# Patient Record
Sex: MALE | Race: WHITE | NOT HISPANIC OR LATINO | Employment: OTHER | URBAN - METROPOLITAN AREA
[De-identification: names, ages, dates, MRNs, and addresses within clinical notes are randomized per-mention and may not be internally consistent; named-entity substitution may affect disease eponyms.]

---

## 2019-05-02 ENCOUNTER — THERAPY VISIT (OUTPATIENT)
Dept: PHYSICAL THERAPY | Facility: CLINIC | Age: 74
End: 2019-05-02
Payer: MEDICARE

## 2019-05-02 DIAGNOSIS — M53.3 SACROILIAC JOINT PAIN: ICD-10-CM

## 2019-05-02 PROCEDURE — 97110 THERAPEUTIC EXERCISES: CPT | Mod: GP | Performed by: PHYSICAL THERAPIST

## 2019-05-02 PROCEDURE — 97035 APP MDLTY 1+ULTRASOUND EA 15: CPT | Mod: GP | Performed by: PHYSICAL THERAPIST

## 2019-05-02 PROCEDURE — 97140 MANUAL THERAPY 1/> REGIONS: CPT | Mod: GP | Performed by: PHYSICAL THERAPIST

## 2019-05-02 PROCEDURE — 97162 PT EVAL MOD COMPLEX 30 MIN: CPT | Mod: GP | Performed by: PHYSICAL THERAPIST

## 2019-05-02 NOTE — LETTER
DEPARTMENT OF HEALTH AND HUMAN SERVICES  CENTERS FOR MEDICARE & MEDICAID SERVICES    PLAN/UPDATED PLAN OF PROGRESS FOR OUTPATIENT REHABILITATION    PATIENTS NAME:  Sunny Mercer     : 1945    [facility]BER:    [id number]    [id number]     PROVIDER NAME: [facility]    MEDICAL RECORD NUMBER: [id number]     START OF CARE DATE:  SOC Date: 19   TYPE:  PT    PRIMARY/TREATMENT DIAGNOSIS: (Pertinent Medical Diagnosis)  Sacroiliac joint pain    VISITS FROM START OF CARE:  Rxs Used: 1     [facility] Initial Evaluation  Subjective:  [facility] Initial Evaluation.    Mr. Mercer is an active 74 year old retired gentleman, who strained his low back in 2019 - while shoveling heavy wet snow. He labels his pain as 8/10. Located along the left SI - and referred into the left leg to the ankle. Myotomes are intact. AROM of the lumbar spine does not cause symptoms. The left SI appears somewhat anteriorly rotated. Sunny is very motivated to start a HEP>    The history is provided by the patient. No  was used.   Sunny Mercer is a 74 year old male with a sacroiliac condition.  Condition occurred with:  Lifting, repetition/overuse and a fall/slip.  Condition occurred: at home.  This is a new condition  2019.  Patient reports pain:  SI joint left.  Radiates to:  Gluteals left and lower leg left.  Pain is described as aching, sharp and shooting and is intermittent and reported as 8/10.  Associated symptoms:  Loss of motion/stiffness. Pain is worse during the day.  Symptoms are exacerbated by lifting and certain positions and relieved by nothing.  Since onset symptoms are unchanged.    General health as reported by patient is good.   Pertinent medical history includes:  Diabetes and high blood pressure.  Medical allergies: no.  Other surgeries include:  Orthopedic surgery (knee).  Current medications:  Other.  Current occupation is  Retired. Oswestry Score: 17.5 %.  Barriers include:  None as reported by the patient.  Red flags:  Unexplained weight loss and pain at night/rest.    Objective:  Standing Alignment:    Lumbar:  Lordosis decr  Gait:    Gait Type:  Antalgic     Deviations:  General Deviations:  Jade decr and stride length decr    PATIENTS NAME:  Sunny Mercer   : 1945- Page 2       Lumbar/SI Evaluation  ROM:    AROM Lumbar:   Flexion:          50%  Ext:                    10%   Side Bend:        Left:     Right:   Rotation:           Left:  50%    Right:  50%  Side Glide:        Left:     Right:   Lumbar Myotomes:  normal  Lumbar DTR's:  not assessed  Cord Signs:  not assessed  Lumbar Dermtomes:  not assessed  Neural Tension/Mobility:    Left side:SLR; SLR w/DF or Slump  negative.   Right side:   SLR w/DF; Slump or SLR  negative.   Lumbar Palpation:    Tenderness present at Left:    Piriformis and PSIS  SI joint/Sacrum:      Sacral conclusion left:  Downslip and anterior inominate                   Musculoskeletal:        Lumbar back: He exhibits tenderness.        Back:      Assessment/Plan:    Patient is a 74 year old male with left SI complaints.    Patient has the following significant findings with corresponding treatment plan.                Diagnosis 1:  Left SI strainPain -  US, manual therapy, self management, education and home program  Impaired gait - gait training and home program  Impaired muscle performance - neuro re-education and home program  Decreased function - therapeutic activities and home program    Therapy Evaluation Codes:   1) History comprised of:   Personal factors that impact the plan of care:    PATIENTS NAME:  Sunny Mercer   : 1945- Page 3      None.    Comorbidity factors that impact the plan of care are:      Diabetes, High blood pressure, Pain at night/rest and unexplained weight loss.     Medications impacting care: diabetes medication.  2) Examination of Body Systems comprised  "of:   Body structures and functions that impact the plan of care:      Sacral illiac joint.   Activity limitations that impact the plan of care are:      Bending, Walking and Sleeping.  3) Clinical presentation characteristics are:   Evolving/Changing.  4) Decision-Making    Moderate complexity using standardized patient assessment instrument and/or measureable assessment of functional outcome.  Cumulative Therapy Evaluation is: Moderate complexity.    Previous and current functional limitations:  (See Goal Flow Sheet for this information)    Short term and Long term goals: (See Goal Flow Sheet for this information)     Communication ability:  Patient appears to be able to clearly communicate and understand verbal and written communication and follow directions correctly.  Treatment Explanation - The following has been discussed with the patient:   RX ordered/plan of care  Anticipated outcomes  Possible risks and side effects  This patient would benefit from PT intervention to resume normal activities.   Rehab potential is good.    Frequency:  1 X week, once daily  Duration:  for 6 weeks  Discharge Plan:  Achieve all LTG.  Independent in home treatment program.  Reach maximal therapeutic benefit.    Caregiver Signature/Credentials _____________________________ Date ________       Treating Provider: Gabriela Greenfield DPT   I have reviewed and certified the need for these services and plan of treatment while under my care.    PHYSICIAN'S SIGNATURE:   _________________________________________  Date___________   Armaan Burgess MD    Certification period:  Beginning of Cert date period: 05/02/19 to  End of Cert period date: 07/21/19     Functional Level Progress Report: Please see attached \"Goal Flow sheet for Functional level.\"    ____X____ Continue Services or       ________ DC Services              Service dates: From  SOC Date: 05/02/19 date to present                         "

## 2019-05-03 NOTE — PROGRESS NOTES
[facility] Initial Evaluation  Subjective:  [facility] Initial Evaluation.    Mr. Mercer is an active 74 year old retired gentleman, who strained his low back in April 2019 - while shoveling heavy wet snow. He labels his pain as 8/10. Located along the left SI - and referred into the left leg to the ankle. Myotomes are intact. AROM of the lumbar spine does not cause symptoms. The left SI appears somewhat anteriorly rotated. Sunny is very motivated to start a HEP>    The history is provided by the patient. No  was used.   Sunny Mercer is a 74 year old male with a sacroiliac condition.  Condition occurred with:  Lifting, repetition/overuse and a fall/slip.  Condition occurred: at home.  This is a new condition  April 2019.    Patient reports pain:  SI joint left.  Radiates to:  Gluteals left and lower leg left.  Pain is described as aching, sharp and shooting and is intermittent and reported as 8/10.  Associated symptoms:  Loss of motion/stiffness. Pain is worse during the day.  Symptoms are exacerbated by lifting and certain positions and relieved by nothing.  Since onset symptoms are unchanged.        General health as reported by patient is good.                  Barriers include:  None as reported by the patient.    Red flags:  Unexplained weight loss and pain at night/rest.                        Objective:  Standing Alignment:        Lumbar:  Lordosis decr            Gait:    Gait Type:  Antalgic     Deviations:  General Deviations:  Jade decr and stride length decr               Lumbar/SI Evaluation  ROM:    AROM Lumbar:   Flexion:          50%  Ext:                    10%   Side Bend:        Left:     Right:   Rotation:           Left:  50%    Right:  50%  Side Glide:        Left:     Right:           Lumbar Myotomes:  normal            Lumbar DTR's:  not assessed      Cord Signs:  not assessed    Lumbar Dermtomes:  not  assessed                Neural Tension/Mobility:      Left side:SLR; SLR w/DF or Slump  negative.     Right side:   SLR w/DF; Slump or SLR  negative.   Lumbar Palpation:    Tenderness present at Left:    Piriformis and PSIS          SI joint/Sacrum:            Sacral conclusion left:  Downslip and anterior inominate                                                                                    Musculoskeletal:        Lumbar back: He exhibits tenderness.        Back:        ROS    Assessment/Plan:    Patient is a 74 year old male with left SI complaints.    Patient has the following significant findings with corresponding treatment plan.                Diagnosis 1:  Left SI strainPain -  US, manual therapy, self management, education and home program  Impaired gait - gait training and home program  Impaired muscle performance - neuro re-education and home program  Decreased function - therapeutic activities and home program    Therapy Evaluation Codes:   1) History comprised of:   Personal factors that impact the plan of care:      None.    Comorbidity factors that impact the plan of care are:      Diabetes, High blood pressure, Pain at night/rest and unexplained weight loss.     Medications impacting care: diabetes medication.  2) Examination of Body Systems comprised of:   Body structures and functions that impact the plan of care:      Sacral illiac joint.   Activity limitations that impact the plan of care are:      Bending, Walking and Sleeping.  3) Clinical presentation characteristics are:   Evolving/Changing.  4) Decision-Making    Moderate complexity using standardized patient assessment instrument and/or measureable assessment of functional outcome.  Cumulative Therapy Evaluation is: Moderate complexity.    Previous and current functional limitations:  (See Goal Flow Sheet for this information)    Short term and Long term goals: (See Goal Flow Sheet for this information)     Communication ability:   Patient appears to be able to clearly communicate and understand verbal and written communication and follow directions correctly.  Treatment Explanation - The following has been discussed with the patient:   RX ordered/plan of care  Anticipated outcomes  Possible risks and side effects  This patient would benefit from PT intervention to resume normal activities.   Rehab potential is good.    Frequency:  1 X week, once daily  Duration:  for 6 weeks  Discharge Plan:  Achieve all LTG.  Independent in home treatment program.  Reach maximal therapeutic benefit.    Please refer to the daily flowsheet for treatment today, total treatment time and time spent performing 1:1 timed codes.

## 2019-05-03 NOTE — PROGRESS NOTES
[facility] Initial Evaluation  Subjective:                                       Pertinent medical history includes:  Diabetes and high blood pressure.  Medical allergies: no.  Other surgeries include:  Orthopedic surgery (knee).  Current medications:  Other.  Current occupation is Retired.                  Oswestry Score: 17.5 %                 Objective:  System    Physical Exam    General     ROS    Assessment/Plan:

## 2019-05-09 ENCOUNTER — THERAPY VISIT (OUTPATIENT)
Dept: PHYSICAL THERAPY | Facility: CLINIC | Age: 74
End: 2019-05-09
Payer: MEDICARE

## 2019-05-09 DIAGNOSIS — M53.3 SACROILIAC JOINT PAIN: ICD-10-CM

## 2019-05-09 PROCEDURE — 97035 APP MDLTY 1+ULTRASOUND EA 15: CPT | Mod: GP | Performed by: PHYSICAL THERAPIST

## 2019-05-09 PROCEDURE — 97110 THERAPEUTIC EXERCISES: CPT | Mod: GP | Performed by: PHYSICAL THERAPIST

## 2019-05-09 PROCEDURE — 97140 MANUAL THERAPY 1/> REGIONS: CPT | Mod: GP | Performed by: PHYSICAL THERAPIST

## 2019-05-16 ENCOUNTER — THERAPY VISIT (OUTPATIENT)
Dept: PHYSICAL THERAPY | Facility: CLINIC | Age: 74
End: 2019-05-16
Payer: MEDICARE

## 2019-05-16 DIAGNOSIS — M53.3 SACROILIAC JOINT PAIN: ICD-10-CM

## 2019-05-16 PROCEDURE — 97110 THERAPEUTIC EXERCISES: CPT | Mod: GP | Performed by: PHYSICAL THERAPIST

## 2019-05-16 PROCEDURE — 97035 APP MDLTY 1+ULTRASOUND EA 15: CPT | Mod: GP | Performed by: PHYSICAL THERAPIST

## 2019-05-23 ENCOUNTER — THERAPY VISIT (OUTPATIENT)
Dept: PHYSICAL THERAPY | Facility: CLINIC | Age: 74
End: 2019-05-23
Payer: MEDICARE

## 2019-05-23 DIAGNOSIS — M53.3 SACROILIAC JOINT PAIN: ICD-10-CM

## 2019-05-23 PROCEDURE — 97140 MANUAL THERAPY 1/> REGIONS: CPT | Mod: GP | Performed by: PHYSICAL THERAPIST

## 2019-05-23 PROCEDURE — 97035 APP MDLTY 1+ULTRASOUND EA 15: CPT | Mod: GP | Performed by: PHYSICAL THERAPIST

## 2019-05-23 NOTE — PROGRESS NOTES
Subjective:  HPI                    Objective:  System    Physical Exam    General     ROS    Assessment/Plan:    PROGRESS  REPORT    Progress reporting period is from 5/2 to 5/23/19.       SUBJECTIVE  Subjective changes noted by patient:  Mr. Mercer reports no improvement with PT. The exercise given him is not painful while he is doing them, but he is painful in the AM. He has changed a daily aspirin intake of 1300 MG ( which he has done for 10 years ) to Tylenol since his injury. Question if this is a variable with his symptoms.       Current pain level is 7/10 in AM, improves by the evening.     Previous pain level was  7/10 Initial Pain level: 8/10.   Changes in function:  None  Adverse reaction to treatment or activity: Any exercise actually makes the symptoms worse - be it stretches or core strengthening. Sunny does not have pain during the exercise, but does the next morning.    OBJECTIVE  Changes noted in objective findings:  Posterior innominate on the left that is not responding to a HEP. Left SI symptoms may be referred from the lumbosacral spine. Sunny does report cramping in the left leg with some pain along the top of his foot. This could follow the L4-5 dermatome.        ASSESSMENT/PLAN  Updated problem list and treatment plan: Diagnosis 1:  LBP  Pain -  US, manual therapy, education and home program  Impaired gait - home program  Impaired muscle performance - neuro re-education and home program  Decreased function - home program  STG/LTGs have been met or progress has been made towards goals:  None  Assessment of Progress: The patient's condition is unchanged.  Self Management Plans:  Patient has been instructed in a home treatment program.  Patient  has been instructed in self management of symptoms.      Recommendations:  Further diagnostics may be in order to determine the continued symptoms that Sunny is having. Sunny is going on a trip June 15th to [place].    Please refer to the daily flowsheet for  treatment today, total treatment time and time spent performing 1:1 timed codes.

## 2019-05-23 NOTE — LETTER
[facility]  [address] [place] [id number]  [phone]    May 23, 2019    Re: Sunny Mercer   :   1945  MRN:  [id number]   REFERRING PHYSICIAN:   Armaan Burgess    [facility]    Date of Initial Evaluation:  2019  Visits:  Rxs Used: 4  Reason for Referral:  Sacroiliac joint pain    EVALUATION SUMMARY    PROGRESS  REPORT  Progress reporting period is from  to 19.       SUBJECTIVE  Subjective changes noted by patient:  Mr. Mercer reports no improvement with PT. The exercise given him is not painful while he is doing them, but he is painful in the AM. He has changed a daily aspirin intake of 1300 MG ( which he has done for 10 years ) to Tylenol since his injury. Question if this is a variable with his symptoms.       Current pain level is 7/10 in AM, improves by the evening.     Previous pain level was  7/10 Initial Pain level: 8/10.   Changes in function:  None  Adverse reaction to treatment or activity: Any exercise actually makes the symptoms worse - be it stretches or core strengthening. Sunny does not have pain during the exercise, but does the next morning.  OBJECTIVE  Changes noted in objective findings:  Posterior innominate on the left that is not responding to a HEP. Left SI symptoms may be referred from the lumbosacral spine. Sunny does report cramping in the left leg with some pain along the top of his foot. This could follow the L4-5 dermatome.  ASSESSMENT/PLAN  Updated problem list and treatment plan: Diagnosis 1:  LBP  Pain -  US, manual therapy, education and home program  Impaired gait - home program  Impaired muscle performance - neuro re-education and home program  Decreased function - home program  STG/LTGs have been met or progress has been made towards goals:  None  Assessment of Progress: The patient's condition is unchanged.  Self Management Plans:  Patient has been instructed in a home treatment program.  Patient   has been instructed in self management of symptoms.      Recommendations:  Further diagnostics may be in order to determine the continued symptoms that Sunny is having. Sunny is going on a trip June 15th to [place].              Thank you for your referral.    INQUIRIES  Therapist: Gabriela Greenfield DPT  [address] [address]  Phone: [phone]  Fax: [phone]

## 2019-06-27 PROBLEM — M53.3 SACROILIAC JOINT PAIN: Status: RESOLVED | Noted: 2019-05-02 | Resolved: 2019-05-23

## 2019-09-28 ENCOUNTER — HEALTH MAINTENANCE LETTER (OUTPATIENT)
Age: 74
End: 2019-09-28

## 2020-03-15 ENCOUNTER — HEALTH MAINTENANCE LETTER (OUTPATIENT)
Age: 75
End: 2020-03-15

## 2021-01-09 ENCOUNTER — HEALTH MAINTENANCE LETTER (OUTPATIENT)
Age: 76
End: 2021-01-09

## 2021-05-08 ENCOUNTER — HEALTH MAINTENANCE LETTER (OUTPATIENT)
Age: 76
End: 2021-05-08

## 2021-08-28 ENCOUNTER — HEALTH MAINTENANCE LETTER (OUTPATIENT)
Age: 76
End: 2021-08-28

## 2021-10-23 ENCOUNTER — HEALTH MAINTENANCE LETTER (OUTPATIENT)
Age: 76
End: 2021-10-23

## 2022-04-09 ENCOUNTER — HEALTH MAINTENANCE LETTER (OUTPATIENT)
Age: 77
End: 2022-04-09

## 2022-04-13 ENCOUNTER — APPOINTMENT (OUTPATIENT)
Dept: URBAN - METROPOLITAN AREA CLINIC 259 | Age: 77
Setting detail: DERMATOLOGY
End: 2022-04-13

## 2022-04-13 DIAGNOSIS — L71.8 OTHER ROSACEA: ICD-10-CM

## 2022-04-13 DIAGNOSIS — L21.8 OTHER SEBORRHEIC DERMATITIS: ICD-10-CM

## 2022-04-13 DIAGNOSIS — L40.0 PSORIASIS VULGARIS: ICD-10-CM

## 2022-04-13 PROCEDURE — 99204 OFFICE O/P NEW MOD 45 MIN: CPT

## 2022-04-13 PROCEDURE — OTHER MIPS QUALITY: OTHER

## 2022-04-13 PROCEDURE — OTHER PRESCRIPTION: OTHER

## 2022-04-13 PROCEDURE — OTHER COUNSELING: OTHER

## 2022-04-13 RX ORDER — METRONIDAZOLE 7.5 MG/G
CREAM TOPICAL
Qty: 45 | Refills: 5 | Status: ERX | COMMUNITY
Start: 2022-04-13

## 2022-04-13 RX ORDER — KETOCONAZOLE 20 MG/G
CREAM TOPICAL BID
Qty: 60 | Refills: 4 | Status: ERX | COMMUNITY
Start: 2022-04-13

## 2022-04-13 RX ORDER — HYDROCORTISONE 25 MG/G
CREAM TOPICAL
Qty: 30 | Refills: 5 | Status: ERX | COMMUNITY
Start: 2022-04-13

## 2022-04-13 ASSESSMENT — LOCATION SIMPLE DESCRIPTION DERM
LOCATION SIMPLE: LEFT BUTTOCK
LOCATION SIMPLE: LEFT FOREARM
LOCATION SIMPLE: RIGHT CHEEK

## 2022-04-13 ASSESSMENT — LOCATION DETAILED DESCRIPTION DERM
LOCATION DETAILED: LEFT PROXIMAL DORSAL FOREARM
LOCATION DETAILED: LEFT BUTTOCK
LOCATION DETAILED: LEFT VENTRAL PROXIMAL FOREARM
LOCATION DETAILED: RIGHT CENTRAL MALAR CHEEK

## 2022-04-13 ASSESSMENT — LOCATION ZONE DERM
LOCATION ZONE: TRUNK
LOCATION ZONE: ARM
LOCATION ZONE: FACE

## 2022-04-13 NOTE — HPI: RASH (PSORIASIS)
Do You Have A Family History Of Psoriasis?: no
How Severe Is Your Psoriasis?: mild
Is This A New Presentation, Or A Follow-Up?: Psoriasis
Additional History: Patient is new to [place] and had a change in insurance. He has some redness on his face and cheeks that he has used betamethasone. He has psoriasis and it originally was on his buttocks. He has a flare up on his left arm. The patient has had the flare up on his left arm for 3 months.

## 2022-06-04 ENCOUNTER — HEALTH MAINTENANCE LETTER (OUTPATIENT)
Age: 77
End: 2022-06-04

## 2022-06-07 ENCOUNTER — THERAPY VISIT (OUTPATIENT)
Dept: PHYSICAL THERAPY | Facility: CLINIC | Age: 77
End: 2022-06-07
Payer: MEDICARE

## 2022-06-07 ENCOUNTER — TRANSCRIBE ORDERS (OUTPATIENT)
Dept: OTHER | Age: 77
End: 2022-06-07

## 2022-06-07 DIAGNOSIS — M54.50 LOW BACK PAIN: Primary | ICD-10-CM

## 2022-06-07 DIAGNOSIS — M54.16 LEFT LUMBAR RADICULOPATHY: ICD-10-CM

## 2022-06-07 PROCEDURE — 97161 PT EVAL LOW COMPLEX 20 MIN: CPT | Mod: GP | Performed by: PHYSICAL THERAPIST

## 2022-06-07 PROCEDURE — 97110 THERAPEUTIC EXERCISES: CPT | Mod: GP | Performed by: PHYSICAL THERAPIST

## 2022-06-07 NOTE — PROGRESS NOTES
Physical Therapy Initial Evaluation  Subjective:  Patient presents to outpatient PT with 3 month h/o back pain s/p fall.   Patient felt sudden pain after slipping on ice.   Patient had MRI, revealed L4-5 nerve compression.  Symptoms into left leg, to foot.  Standing aggravates symptoms.  Walking can aggravate symptoms, not as bad as standing.  Transition in bed can aggravate symptoms.  Bending has become more painful.  Patient does walk for exercise.      The history is provided by the patient. No  was used.   Patient Health History  Sunny Toering being seen for to try to minimize pain.     Problem began: 3/8/2022.   Problem occurred: slipping on ice and jerking back   Pain is reported as 6/10 on pain scale.  General health as reported by patient is good.  Pertinent medical history includes: diabetes, heart problems, high blood pressure, sleep disorder/apnea and thyroid problems.   Red flags:  None as reported by patient.  Medical allergies: none.   Surgeries include:  Orthopedic surgery. Other surgery history details: meniscus repair, prostate/bladder surgery.    Current medications:  Cardiac medication, high blood pressure medication and thyroid medication.    Current occupation is retired.   Primary job tasks include:  Driving, lifting/carrying, prolonged sitting and other.   Other job/home tasks details: yard work, limited home repair, snow removal.                Therapist Generated HPI Evaluation         Type of problem:  Lumbar.    This is a new condition.  Condition occurred with:  A fall/slip.  Where condition occurred: at home.  Patient reports pain:  Lumbar spine left.  and is intermittent.  Pain radiates to:  Gluteals left, thigh left, lower leg left and foot left.   Since onset symptoms are unchanged.  Associated symptoms:  Loss of motion/stiffness. Symptoms are exacerbated by bending, walking and standing  and relieved by rest.  Special tests included:  MRI.    Barriers include:   None as reported by patient.                        Objective:  System         Lumbar/SI Evaluation    Lumbar Myotomes:    T12-L3 (Hip Flex):  Left: 5-    Right: 5  L2-4 (Quads):  Left:  5    Right:  5  L4 (Ankle DF):  Left:  5    Right:  5            Neural Tension/Mobility:    Left side:  SLR positive.  Left side:Slump  negative.     Right side:   Slump or SLR  negative.   Lumbar Palpation:  normal                                                           Jj Lumbar Evaluation    Posture:  Sitting: fair  Standing: fair  Lordosis: Reduced  Lateral Shift: no  Correction of Posture: no effect    Movement Loss:  Flexion (Flex): min  Extension (EXT): mod and pain  Side [place] R (SG R): min  Side Glide L (SG L): mod and pain  Test Movements:            SGIS L:   Repeat SGIS L: During: increases  After: no worse  Mechanical Response: no effect  Static Tests:          Lying Prone in Extension: prone on elbows x 2 minutes (2 trials)- produces left low back, no worse    Conclusion: derangement  Principle of Treatment:          Other: trial left lumbar flexion rotation                                       ROS    Assessment/Plan:    Patient is a 77 year old male with lumbar complaints.    Patient has the following significant findings with corresponding treatment plan.                Diagnosis 1:  Left lumbar and leg  Pain -  manual therapy, self management, education, directional preference exercise and home program  Decreased ROM/flexibility - manual therapy, therapeutic exercise and home program  Decreased joint mobility - manual therapy, therapeutic exercise and home program  Decreased strength - therapeutic exercise, therapeutic activities and home program  Impaired posture - neuro re-education and home program    Therapy Evaluation Codes:   1) History comprised of:   Personal factors that impact the plan of care:      None.    Comorbidity factors that impact the plan of care are:      Diabetes, Heart problems, High  blood pressure and Sleep disorder/apnea.     Medications impacting care: Cardiac, High blood pressure and thyroid.  2) Examination of Body Systems comprised of:   Body structures and functions that impact the plan of care:      Lumbar spine.   Activity limitations that impact the plan of care are:      Bending, Standing and Walking.  3) Clinical presentation characteristics are:   Stable/Uncomplicated.  4) Decision-Making    Low complexity using standardized patient assessment instrument and/or measureable assessment of functional outcome.  Cumulative Therapy Evaluation is: Low complexity.    Previous and current functional limitations:  (See Goal Flow Sheet for this information)    Short term and Long term goals: (See Goal Flow Sheet for this information)     Communication ability:  Patient appears to be able to clearly communicate and understand verbal and written communication and follow directions correctly.  Treatment Explanation - The following has been discussed with the patient:   RX ordered/plan of care  Anticipated outcomes  Possible risks and side effects  This patient would benefit from PT intervention to resume normal activities.   Rehab potential is good.    Frequency:  1 X week, once daily  Duration:  for 8 weeks  Discharge Plan:  Achieve all LTG.  Independent in home treatment program.  Reach maximal therapeutic benefit.    Please refer to the daily flowsheet for treatment today, total treatment time and time spent performing 1:1 timed codes.

## 2022-06-07 NOTE — PROGRESS NOTES
[facility]    OUTPATIENT Physical Therapy ORTHOPEDIC EVALUATION  PLAN OF TREATMENT FOR OUTPATIENT REHABILITATION  (COMPLETE FOR INITIAL CLAIMS ONLY)  Patient's Last Name, First Name, M.I.  YOB: 1945  Sunny Mercer       Provider s Name:  [facility]   Medical Record No.  [id number]   Start of Care Date:  06/07/22   Onset Date:   03/08/22   Type:     _X__PT   ___OT Medical Diagnosis:    Encounter Diagnosis   Name Primary?    Left lumbar radiculopathy         Treatment Diagnosis:  left lumbar radiculopathy        Goals:     06/07/22 0500   Body Part   Goals listed below are for Low back pain   Goal #1   Goal #1 standing   Previous Functional Level No restrictions   Current Functional Level Minutes patient can stand   Performance level less than 5   STG Target Performance Minutes patient will be able to stand   Performance level 10   Rationale for personal hygiene   Due date 07/05/22   LTG Target Performance Minutes patient will be able to stand   Performance Level 20   Rationale for personal hygiene   Due date 08/02/22       Therapy Frequency:  1x/week  Predicted Duration of Therapy Intervention:  8 weeks    Barbara Warren, SAROJ                 I CERTIFY THE NEED FOR THESE SERVICES FURNISHED UNDER        THIS PLAN OF TREATMENT AND WHILE UNDER MY CARE .             Physician Signature               Date    X_____________________________________________________                         Certification Date From:  06/07/22   Certification Date To:  09/04/22    Referring Provider:  Sebastian Chavis    Initial Assessment        See Epic Evaluation SOC Date: 06/07/22

## 2022-06-14 ENCOUNTER — THERAPY VISIT (OUTPATIENT)
Dept: PHYSICAL THERAPY | Facility: CLINIC | Age: 77
End: 2022-06-14
Payer: MEDICARE

## 2022-06-14 DIAGNOSIS — M54.16 LEFT LUMBAR RADICULOPATHY: Primary | ICD-10-CM

## 2022-06-14 PROCEDURE — 97110 THERAPEUTIC EXERCISES: CPT | Mod: GP | Performed by: PHYSICAL THERAPIST

## 2022-06-28 ENCOUNTER — THERAPY VISIT (OUTPATIENT)
Dept: PHYSICAL THERAPY | Facility: CLINIC | Age: 77
End: 2022-06-28
Payer: MEDICARE

## 2022-06-28 DIAGNOSIS — M54.16 LEFT LUMBAR RADICULOPATHY: Primary | ICD-10-CM

## 2022-06-28 PROCEDURE — 97140 MANUAL THERAPY 1/> REGIONS: CPT | Mod: GP | Performed by: PHYSICAL THERAPIST

## 2022-06-28 PROCEDURE — 97110 THERAPEUTIC EXERCISES: CPT | Mod: GP | Performed by: PHYSICAL THERAPIST

## 2022-07-26 ENCOUNTER — THERAPY VISIT (OUTPATIENT)
Dept: PHYSICAL THERAPY | Facility: CLINIC | Age: 77
End: 2022-07-26
Payer: MEDICARE

## 2022-07-26 DIAGNOSIS — M54.16 LEFT LUMBAR RADICULOPATHY: Primary | ICD-10-CM

## 2022-07-26 PROCEDURE — 97110 THERAPEUTIC EXERCISES: CPT | Mod: GP | Performed by: PHYSICAL THERAPIST

## 2022-07-26 NOTE — LETTER
HSAN [facility]  [address]  [address]  [facility] [id number] [phone]    2022    Re: Sunny Mercer   :   1945  MRN:  [id number]   REFERRING PHYSICIAN:   Sebastian TORREZ [facility]    Date of Initial Evaluation:  2022  Visits:  Rxs Used: 4  Reason for Referral:  Left lumbar radiculopathy     Lumbar/SI Evaluation  Lumbar Myotomes:    T12-L3 (Hip Flex):  Left: 5    Right: 5  L2-4 (Quads):  Left:  5    Right:  5  L4 (Ankle DF):  Left:  5    Right:  5    Jj Lumbar Evaluation  Movement Loss:  Flexion (Flex): min  Extension (EXT): mod  Side [place] R (SG R): min  Side [place] L (SG L): min    PROGRESS  REPORT    Progress reporting period is from 22 to 22.       SUBJECTIVE  Subjective changes noted by patient:  Patient reports symptoms have improved.  Continues to experience back and leg symptoms with walking and standing, but symptoms decreased in intensity (able to walk as long as he wants).       Current pain level is 2/10  .     Previous pain level was  6/10 Initial Pain level: 6/10.   Changes in function:  Yes (See Goal flowsheet attached for changes in current functional level)  Adverse reaction to treatment or activity: None    OBJECTIVE  Changes noted in objective findings:  Yes, increased lumbar AROM with lumbar extension stretching.      ASSESSMENT/PLAN  Updated problem list and treatment plan: Diagnosis 1:  Left lumbar radiculopathy  Pain - self management, education, directional preference exercise and home program  Decreased ROM/flexibility - home program  Re: Sunny Mercer   :   1945        Decreased strength - home program  STG/LTGs have been met or progress has been made towards goals:  Yes (See Goal flow sheet completed today.)  Assessment of Progress: The patient's condition is improving.  Self Management Plans:  Patient is independent in self  management of symptoms.  I have re-evaluated this patient and find that the nature, scope, duration and intensity of the therapy is appropriate for the medical condition of the patient.  Sunny continues to require the following intervention to meet STG and LTG's:  Trial self-management over next 2 months, will follow up in PT at that time to re-assess progress and update plan of care as indicated.    Recommendations:  This patient would benefit from continued therapy.     Frequency:  Follow up in 2 months  Duration:  for 2 months    Thank you for your referral.      INQUIRIES  Therapist: Barbara Warren DPT   [address] [address]  Phone: [phone]  Fax: [phone]

## 2022-07-26 NOTE — PROGRESS NOTES
Subjective:  HPI  Physical Exam                    Objective:  System         Lumbar/SI Evaluation    Lumbar Myotomes:    T12-L3 (Hip Flex):  Left: 5    Right: 5  L2-4 (Quads):  Left:  5    Right:  5  L4 (Ankle DF):  Left:  5    Right:  5                                                                     Jj Lumbar Evaluation      Movement Loss:  Flexion (Flex): min  Extension (EXT): mod  Side [place] R (SG R): min  Side [employer] L (SG L): min                                               ROS    Assessment/Plan:    PROGRESS  REPORT    Progress reporting period is from 6/7/22 to 7/26/22.       SUBJECTIVE  Subjective changes noted by patient:  Patient reports symptoms have improved.  Continues to experience back and leg symptoms with walking and standing, but symptoms decreased in intensity (able to walk as long as he wants).       Current pain level is 2/10  .     Previous pain level was  6/10 Initial Pain level: 6/10.   Changes in function:  Yes (See Goal flowsheet attached for changes in current functional level)  Adverse reaction to treatment or activity: None    OBJECTIVE  Changes noted in objective findings:  Yes, increased lumbar AROM with lumbar extension stretching.        ASSESSMENT/PLAN  Updated problem list and treatment plan: Diagnosis 1:  Left lumbar radiculopathy  Pain -  self management, education, directional preference exercise and home program  Decreased ROM/flexibility - home program  Decreased strength - home program  STG/LTGs have been met or progress has been made towards goals:  Yes (See Goal flow sheet completed today.)  Assessment of Progress: The patient's condition is improving.  Self Management Plans:  Patient is independent in self management of symptoms.  I have re-evaluated this patient and find that the nature, scope, duration and intensity of the therapy is appropriate for the medical condition of the patient.  Sunny continues to require the following intervention to meet STG and  LTG's:  Trial self-management over next 2 months, will follow up in PT at that time to re-assess progress and update plan of care as indicated.    Recommendations:  This patient would benefit from continued therapy.     Frequency:  Follow up in 2 months  Duration:  for 2 months        Please refer to the daily flowsheet for treatment today, total treatment time and time spent performing 1:1 timed codes.

## 2022-09-20 ENCOUNTER — THERAPY VISIT (OUTPATIENT)
Dept: PHYSICAL THERAPY | Facility: CLINIC | Age: 77
End: 2022-09-20
Payer: MEDICARE

## 2022-09-20 DIAGNOSIS — M54.16 LEFT LUMBAR RADICULOPATHY: Primary | ICD-10-CM

## 2022-09-20 PROCEDURE — 97110 THERAPEUTIC EXERCISES: CPT | Mod: GP | Performed by: PHYSICAL THERAPIST

## 2022-09-20 NOTE — LETTER
SHAN [facility]  [address]  [address]  [facility] [id number] [phone]    2022    Re: Sunny Mecrer   :   1945  MRN:  [id number]   REFERRING PHYSICIAN:   Sebastian TORREZ [facility]    Date of Initial Evaluation:  2022  Visits:  Rxs Used: 5  Reason for Referral:  Left lumbar radiculopathy    EVALUATION SUMMARY    [place] Lumbar Evaluation  Movement Loss:  Flexion (Flex): min and pain  Extension (EXT): mod and pain  Side [place] R (SG R): mod and pain  Side [place] L (SG L): mod                         DISCHARGE REPORT    Progress reporting period is from 22 to 22.       SUBJECTIVE  Subjective changes noted by patient:  Patient reports symptoms remain with standing (eg. At a museum), while walking is improved.  Patient expresses confidence with self-management at this time.    Significant improvement in Oswestry (from 28% at eval to 8% today).  Current pain level is 1/10  .     Previous pain level was  6/10 Initial Pain level: 6/10.   Changes in function:  Yes (See Goal flowsheet attached for changes in current functional level)  Adverse reaction to treatment or activity: None    OBJECTIVE  Changes noted in objective findings:  Yes, Patient demonstrates increased hip strength, continued restriction with lumbar AROM (although no pain reproduction into leg).      ASSESSMENT/PLAN  Updated problem list and treatment plan: Diagnosis 1:  Low back pain  Pain -  home program  STG/LTGs have been met or progress has been made towards goals:  Yes (See Goal flow sheet completed today.)  Assessment of Progress: The patient's condition is improving.  Self Management Plans:  Patient is independent in self management of symptoms.  I have re-evaluated this patient and find that the nature, scope, duration and intensity of the therapy is appropriate for the medical condition of the  patient.  Sunny continues to require the following intervention to meet STG and LTG's:  PT intervention is no longer required to meet STG/LTG.    Recommendations:  This patient is ready to be discharged from therapy and continue their home treatment program.      Thank you for your referral.    INQUIRIES  Therapist: Barbara Warren DPT  [address] [address]  Phone: [phone]  Fax: [phone]

## 2022-09-20 NOTE — PROGRESS NOTES
Subjective:  HPI  Physical Exam                    Objective:  System    Physical Exam      Jj Lumbar Evaluation      Movement Loss:  Flexion (Flex): min and pain  Extension (EXT): mod and pain  Side [place] R (SG R): mod and pain  Side [place] L (SG L): mod                                               ROS    Assessment/Plan:    DISCHARGE REPORT    Progress reporting period is from 6/7/22 to 9/20/22.       SUBJECTIVE  Subjective changes noted by patient:  Patient reports symptoms remain with standing (eg. At a museum), while walking is improved.  Patient expresses confidence with self-management at this time.    Significant improvement in Oswestry (from 28% at eval to 8% today).  Current pain level is 1/10  .     Previous pain level was  6/10 Initial Pain level: 6/10.   Changes in function:  Yes (See Goal flowsheet attached for changes in current functional level)  Adverse reaction to treatment or activity: None    OBJECTIVE  Changes noted in objective findings:  Yes, Patient demonstrates increased hip strength, continued restriction with lumbar AROM (although no pain reproduction into leg).        ASSESSMENT/PLAN  Updated problem list and treatment plan: Diagnosis 1:  Low back pain  Pain -  home program  STG/LTGs have been met or progress has been made towards goals:  Yes (See Goal flow sheet completed today.)  Assessment of Progress: The patient's condition is improving.  Self Management Plans:  Patient is independent in self management of symptoms.  I have re-evaluated this patient and find that the nature, scope, duration and intensity of the therapy is appropriate for the medical condition of the patient.  Sunny continues to require the following intervention to meet STG and LTG's:  PT intervention is no longer required to meet STG/LTG.    Recommendations:  This patient is ready to be discharged from therapy and continue their home treatment program.    Please refer to the daily flowsheet for treatment today,  total treatment time and time spent performing 1:1 timed codes.

## 2022-11-26 ENCOUNTER — HEALTH MAINTENANCE LETTER (OUTPATIENT)
Age: 77
End: 2022-11-26

## 2023-03-25 ENCOUNTER — HEALTH MAINTENANCE LETTER (OUTPATIENT)
Age: 78
End: 2023-03-25

## 2023-06-10 ENCOUNTER — HEALTH MAINTENANCE LETTER (OUTPATIENT)
Age: 78
End: 2023-06-10

## 2024-01-06 ENCOUNTER — HEALTH MAINTENANCE LETTER (OUTPATIENT)
Age: 79
End: 2024-01-06

## 2024-05-25 ENCOUNTER — HEALTH MAINTENANCE LETTER (OUTPATIENT)
Age: 79
End: 2024-05-25

## 2024-08-03 ENCOUNTER — HEALTH MAINTENANCE LETTER (OUTPATIENT)
Age: 79
End: 2024-08-03

## 2025-02-22 ENCOUNTER — HEALTH MAINTENANCE LETTER (OUTPATIENT)
Age: 80
End: 2025-02-22

## 2025-06-14 ENCOUNTER — HEALTH MAINTENANCE LETTER (OUTPATIENT)
Age: 80
End: 2025-06-14